# Patient Record
Sex: FEMALE | Race: WHITE | NOT HISPANIC OR LATINO | Employment: FULL TIME | URBAN - NONMETROPOLITAN AREA
[De-identification: names, ages, dates, MRNs, and addresses within clinical notes are randomized per-mention and may not be internally consistent; named-entity substitution may affect disease eponyms.]

---

## 2019-09-03 ENCOUNTER — OFFICE VISIT (OUTPATIENT)
Dept: FAMILY MEDICINE CLINIC | Facility: CLINIC | Age: 34
End: 2019-09-03

## 2019-09-03 VITALS
WEIGHT: 143 LBS | SYSTOLIC BLOOD PRESSURE: 112 MMHG | OXYGEN SATURATION: 100 % | DIASTOLIC BLOOD PRESSURE: 81 MMHG | HEART RATE: 85 BPM | HEIGHT: 64 IN | TEMPERATURE: 98 F | BODY MASS INDEX: 24.41 KG/M2

## 2019-09-03 DIAGNOSIS — E28.2 POLYCYSTIC OVARY SYNDROME: Primary | ICD-10-CM

## 2019-09-03 DIAGNOSIS — N92.0 MENORRHAGIA WITH REGULAR CYCLE: ICD-10-CM

## 2019-09-03 DIAGNOSIS — R53.83 FATIGUE, UNSPECIFIED TYPE: ICD-10-CM

## 2019-09-03 DIAGNOSIS — N92.6 IRREGULAR MENSES: ICD-10-CM

## 2019-09-03 DIAGNOSIS — E55.9 VITAMIN D DEFICIENCY: ICD-10-CM

## 2019-09-03 DIAGNOSIS — E78.5 DYSLIPIDEMIA: ICD-10-CM

## 2019-09-03 DIAGNOSIS — E07.9 THYROID DISORDER: ICD-10-CM

## 2019-09-03 DIAGNOSIS — D50.8 OTHER IRON DEFICIENCY ANEMIA: ICD-10-CM

## 2019-09-03 DIAGNOSIS — L68.0 HIRSUTISM: ICD-10-CM

## 2019-09-03 LAB
ALBUMIN SERPL-MCNC: 4.4 G/DL (ref 3.5–4.8)
ALBUMIN/GLOB SERPL: 1.3 G/DL (ref 1–1.7)
ALP SERPL-CCNC: 52 U/L (ref 32–91)
ALT SERPL W P-5'-P-CCNC: 15 U/L (ref 14–54)
ANION GAP SERPL CALCULATED.3IONS-SCNC: 14.2 MMOL/L (ref 5–15)
ARTICHOKE IGE QN: 146 MG/DL (ref 0–100)
AST SERPL-CCNC: 19 U/L (ref 15–41)
BILIRUB SERPL-MCNC: 0.9 MG/DL (ref 0.3–1.2)
BUN BLD-MCNC: 11 MG/DL (ref 8–20)
BUN/CREAT SERPL: 18.3 (ref 5.4–26.2)
CALCIUM SPEC-SCNC: 9.5 MG/DL (ref 8.9–10.3)
CHLORIDE SERPL-SCNC: 102 MMOL/L (ref 101–111)
CHOLEST SERPL-MCNC: 222 MG/DL
CO2 SERPL-SCNC: 25 MMOL/L (ref 22–32)
CREAT BLD-MCNC: 0.6 MG/DL (ref 0.4–1)
DEPRECATED RDW RBC AUTO: 39.4 FL (ref 37–54)
ERYTHROCYTE [DISTWIDTH] IN BLOOD BY AUTOMATED COUNT: 14.6 % (ref 12.3–15.4)
FERRITIN SERPL-MCNC: 4 NG/ML (ref 11–307)
GFR SERPL CREATININE-BSD FRML MDRD: 114 ML/MIN/1.73
GLOBULIN UR ELPH-MCNC: 3.4 GM/DL (ref 2.5–3.8)
GLUCOSE BLD-MCNC: 83 MG/DL (ref 65–99)
HCT VFR BLD AUTO: 40.4 % (ref 34–46.6)
HDLC SERPL QL: 3.36
HDLC SERPL-MCNC: 66 MG/DL
HGB BLD-MCNC: 13.6 G/DL (ref 12–15.9)
IRON 24H UR-MRATE: 34 MCG/DL (ref 28–170)
IRON SATN MFR SERPL: 8 % (ref 15–50)
LDLC/HDLC SERPL: 2.18 {RATIO}
MCH RBC QN AUTO: 26.1 PG (ref 26.6–33)
MCHC RBC AUTO-ENTMCNC: 33.6 G/DL (ref 31.5–35.7)
MCV RBC AUTO: 77.6 FL (ref 79–97)
PLATELET # BLD AUTO: 230 10*3/MM3 (ref 140–450)
PMV BLD AUTO: 9.7 FL (ref 6–12)
POTASSIUM BLD-SCNC: 4.2 MMOL/L (ref 3.6–5.1)
PROT SERPL-MCNC: 7.8 G/DL (ref 6.1–7.9)
RBC # BLD AUTO: 5.21 10*6/MM3 (ref 3.77–5.28)
SODIUM BLD-SCNC: 137 MMOL/L (ref 136–144)
T4 FREE SERPL-MCNC: 0.99 NG/DL (ref 0.58–1.64)
TIBC SERPL-MCNC: 412 MCG/DL (ref 228–428)
TRANSFERRIN SERPL-MCNC: 294 MG/DL (ref 200–360)
TRIGL SERPL-MCNC: 60 MG/DL
TSH SERPL DL<=0.05 MIU/L-ACNC: 0.43 UIU/ML (ref 0.34–5.6)
VIT B12 BLD-MCNC: 260 PG/ML (ref 180–914)
VLDLC SERPL-MCNC: 12 MG/DL
WBC NRBC COR # BLD: 6.3 10*3/MM3 (ref 3.4–10.8)

## 2019-09-03 PROCEDURE — 82306 VITAMIN D 25 HYDROXY: CPT | Performed by: FAMILY MEDICINE

## 2019-09-03 PROCEDURE — 84466 ASSAY OF TRANSFERRIN: CPT | Performed by: FAMILY MEDICINE

## 2019-09-03 PROCEDURE — 82607 VITAMIN B-12: CPT | Performed by: FAMILY MEDICINE

## 2019-09-03 PROCEDURE — 84439 ASSAY OF FREE THYROXINE: CPT | Performed by: FAMILY MEDICINE

## 2019-09-03 PROCEDURE — 80053 COMPREHEN METABOLIC PANEL: CPT | Performed by: FAMILY MEDICINE

## 2019-09-03 PROCEDURE — 85027 COMPLETE CBC AUTOMATED: CPT | Performed by: FAMILY MEDICINE

## 2019-09-03 PROCEDURE — 84443 ASSAY THYROID STIM HORMONE: CPT | Performed by: FAMILY MEDICINE

## 2019-09-03 PROCEDURE — 83540 ASSAY OF IRON: CPT | Performed by: FAMILY MEDICINE

## 2019-09-03 PROCEDURE — 99203 OFFICE O/P NEW LOW 30 MIN: CPT | Performed by: FAMILY MEDICINE

## 2019-09-03 PROCEDURE — 80061 LIPID PANEL: CPT | Performed by: FAMILY MEDICINE

## 2019-09-03 PROCEDURE — 82728 ASSAY OF FERRITIN: CPT | Performed by: FAMILY MEDICINE

## 2019-09-03 RX ORDER — MULTIPLE VITAMINS W/ MINERALS TAB 9MG-400MCG
1 TAB ORAL DAILY
COMMUNITY

## 2019-09-05 LAB — 25(OH)D3 SERPL-MCNC: 32.4 NG/ML (ref 30–100)

## 2019-09-17 ENCOUNTER — OFFICE VISIT (OUTPATIENT)
Dept: FAMILY MEDICINE CLINIC | Facility: CLINIC | Age: 34
End: 2019-09-17

## 2019-09-17 VITALS
SYSTOLIC BLOOD PRESSURE: 104 MMHG | BODY MASS INDEX: 23.63 KG/M2 | OXYGEN SATURATION: 100 % | HEIGHT: 65 IN | WEIGHT: 141.8 LBS | HEART RATE: 76 BPM | TEMPERATURE: 97.9 F | DIASTOLIC BLOOD PRESSURE: 69 MMHG

## 2019-09-17 DIAGNOSIS — E78.5 DYSLIPIDEMIA: Primary | ICD-10-CM

## 2019-09-17 DIAGNOSIS — L68.0 HIRSUTISM: ICD-10-CM

## 2019-09-17 DIAGNOSIS — N92.0 MENORRHAGIA WITH REGULAR CYCLE: ICD-10-CM

## 2019-09-17 DIAGNOSIS — E28.2 POLYCYSTIC OVARY SYNDROME: ICD-10-CM

## 2019-09-17 LAB
FSH SERPL-ACNC: 5.47 MIU/ML
LH SERPL-ACNC: 29.85 MIU/ML
PROLACTIN SERPL-MCNC: 5.82 NG/ML (ref 3.3–27)

## 2019-09-17 PROCEDURE — 84146 ASSAY OF PROLACTIN: CPT | Performed by: FAMILY MEDICINE

## 2019-09-17 PROCEDURE — 83002 ASSAY OF GONADOTROPIN (LH): CPT | Performed by: FAMILY MEDICINE

## 2019-09-17 PROCEDURE — 99213 OFFICE O/P EST LOW 20 MIN: CPT | Performed by: FAMILY MEDICINE

## 2019-09-17 PROCEDURE — 84402 ASSAY OF FREE TESTOSTERONE: CPT | Performed by: FAMILY MEDICINE

## 2019-09-17 PROCEDURE — 83001 ASSAY OF GONADOTROPIN (FSH): CPT | Performed by: FAMILY MEDICINE

## 2019-09-17 RX ORDER — INFLUENZA VIRUS VACCINE 15; 15; 15; 15 UG/.5ML; UG/.5ML; UG/.5ML; UG/.5ML
SUSPENSION INTRAMUSCULAR
Refills: 0 | COMMUNITY
Start: 2019-09-13 | End: 2020-04-21

## 2019-09-20 RX ORDER — SPIRONOLACTONE 25 MG/1
25 TABLET ORAL DAILY
Qty: 30 TABLET | Refills: 3 | Status: SHIPPED | OUTPATIENT
Start: 2019-09-20 | End: 2020-04-21 | Stop reason: SINTOL

## 2019-09-20 RX ORDER — METFORMIN HYDROCHLORIDE 500 MG/1
500 TABLET, FILM COATED, EXTENDED RELEASE ORAL
Qty: 30 TABLET | Refills: 3 | Status: SHIPPED | OUTPATIENT
Start: 2019-09-20 | End: 2020-04-09 | Stop reason: SDUPTHER

## 2019-09-21 LAB — TESTOST FREE SERPL-MCNC: 1.1 PG/ML (ref 0–4.2)

## 2019-09-27 ENCOUNTER — DOCUMENTATION (OUTPATIENT)
Dept: FAMILY MEDICINE CLINIC | Facility: CLINIC | Age: 34
End: 2019-09-27

## 2019-10-21 NOTE — PROGRESS NOTES
Ismael Mackey is a 34 y.o. female.   Pt here for lab fu  Hx  Polycystic Ovary  Syndrome with hirsutism  Labs reviewed  Elevated total cholesterol and LDL, healthy heart diet recommended  Hormone levels need to be checked due  To patient concern about menorrhagia, PCOS, hirsutism      Chief Complaint   Patient presents with   • Follow-up     1 week follow up         The following portions of the patient's history were reviewed and updated as appropriate: allergies, current medications, past family history, past medical history, past social history, past surgical history and problem list.    Past Medical History:   Diagnosis Date   • Hirsutism    • Polycystic ovary disease        Past Surgical History:   Procedure Laterality Date   •  SECTION     • PELVIC LAPAROSCOPY      ovarian cyst       Family History   Problem Relation Age of Onset   • Hypertension Mother    • Thyroid disease Mother    • Hyperlipidemia Mother    • Heart disease Father        Review of Systems   Genitourinary: Positive for menstrual problem (heavy periods).   Skin:        hirsutism       Objective   Physical Exam   Constitutional: She is oriented to person, place, and time. She appears well-developed and well-nourished. No distress.   HENT:   Head: Normocephalic and atraumatic.   Right Ear: External ear normal.   Left Ear: External ear normal.   Nose: Nose normal.   Mouth/Throat: Oropharynx is clear and moist.   Eyes: EOM are normal. Pupils are equal, round, and reactive to light.   Neck: Normal range of motion. Neck supple. No thyromegaly present.   Cardiovascular: Normal rate, regular rhythm and normal heart sounds. Exam reveals no gallop and no friction rub.   No murmur heard.  Pulmonary/Chest: Effort normal. She has no wheezes.   Abdominal: Soft. There is no tenderness.   Musculoskeletal: Normal range of motion. She exhibits no edema, tenderness or deformity.   Lymphadenopathy:     She has no cervical adenopathy.    Neurological: She is alert and oriented to person, place, and time. No cranial nerve deficit.   Skin: Skin is warm and dry. No rash noted. She is not diaphoretic.   Hirsute female   Psychiatric: She has a normal mood and affect. Her behavior is normal. Judgment and thought content normal.   Nursing note and vitals reviewed.        Assessment/Plan   Tez was seen today for follow-up.    Diagnoses and all orders for this visit:    Dyslipidemia    Polycystic ovary syndrome  -     Follicle stimulating hormone  -     Prolactin  -     Luteinizing hormone  -     Testosterone Free Direct    Hirsutism  -     Follicle stimulating hormone  -     Prolactin  -     Luteinizing hormone  -     Testosterone Free Direct    Menorrhagia with regular cycle      Fu labs  Low cholesteterol diet recommended           Vitals:    09/17/19 0932   BP: 104/69   Pulse: 76   Temp: 97.9 °F (36.6 °C)   SpO2: 100%       LDL Cholesterol    Date Value Ref Range Status   09/03/2019 146 (H) 0 - 100 mg/dL Final

## 2020-04-09 RX ORDER — METFORMIN HYDROCHLORIDE 500 MG/1
500 TABLET, FILM COATED, EXTENDED RELEASE ORAL
Qty: 30 TABLET | Refills: 0 | Status: SHIPPED | OUTPATIENT
Start: 2020-04-09 | End: 2020-04-14 | Stop reason: SDUPTHER

## 2020-04-14 RX ORDER — METFORMIN HYDROCHLORIDE 500 MG/1
500 TABLET, FILM COATED, EXTENDED RELEASE ORAL
Qty: 30 TABLET | Refills: 0 | Status: SHIPPED | OUTPATIENT
Start: 2020-04-14 | End: 2020-07-08

## 2020-04-21 ENCOUNTER — OFFICE VISIT (OUTPATIENT)
Dept: FAMILY MEDICINE CLINIC | Facility: CLINIC | Age: 35
End: 2020-04-21

## 2020-04-21 DIAGNOSIS — E28.2 PCOS (POLYCYSTIC OVARIAN SYNDROME): Primary | ICD-10-CM

## 2020-04-21 PROCEDURE — 99212 OFFICE O/P EST SF 10 MIN: CPT | Performed by: FAMILY MEDICINE

## 2020-04-21 RX ORDER — METFORMIN HYDROCHLORIDE 500 MG/1
500 TABLET, EXTENDED RELEASE ORAL
Qty: 90 TABLET | Refills: 1 | Status: SHIPPED | OUTPATIENT
Start: 2020-04-21

## 2020-04-21 NOTE — PROGRESS NOTES
Ismael Mackey is a 34 y.o. female.     Chief Complaint   Patient presents with   • Polycystic Ovary Syndrome     med refill     You have chosen to receive care through a telephone visit. Do you consent to use a telephone visit for your medical care today? Yes    History of Present Illness Pt doing well on metformin for PCOS, no need to increase dose  spirinoloactose had side effects and she discontinued this  She continues to work at the pharmacy.  Has had no known sick exposures, no cough, no fever  Her  is now here from [place] and they are all doing well    The following portions of the patient's history were reviewed and updated as appropriate: allergies, current medications, past family history, past medical history, past social history, past surgical history and problem list.    Past Medical History:   Diagnosis Date   • Hirsutism    • Polycystic ovary disease        Past Surgical History:   Procedure Laterality Date   •  SECTION     • PELVIC LAPAROSCOPY      ovarian cyst       Family History   Problem Relation Age of Onset   • Hypertension Mother    • Thyroid disease Mother    • Hyperlipidemia Mother    • Heart disease Father        Review of Systems   Constitutional: Negative for fever.   HENT: Negative for congestion and sore throat.    Respiratory: Negative for cough and shortness of breath.        Objective   Physical Exam  There were no vitals filed for this visit.  There is no height or weight on file to calculate BMI.    LDL Cholesterol    Date Value Ref Range Status   2019 146 (H) 0 - 100 mg/dL Final     TSH   Date Value Ref Range Status   2019 0.430 0.340 - 5.600 uIU/mL Final     Comment:     Results may be falsely decreased if patient taking Biotin.     Results for orders placed or performed in visit on 19   Testosterone Free Direct   Result Value Ref Range    Testosterone, Free 1.1 0.0 - 4.2 pg/mL   Prolactin   Result Value Ref Range    Prolactin  5.82 3.30 - 27.00 ng/mL   Luteinizing hormone   Result Value Ref Range    LH 29.85 mIU/mL   Follicle stimulating hormone   Result Value Ref Range    FSH 5.47 mIU/mL   Results for orders placed or performed in visit on 09/03/19   Iron Profile   Result Value Ref Range    Iron 34 28 - 170 mcg/dL    Iron Saturation 8 (L) 15 - 50 %    Transferrin 294 200 - 360 mg/dL    TIBC 412 228 - 428 mcg/dL   Vitamin D 25 Hydroxy   Result Value Ref Range    25 Hydroxy, Vitamin D 32.4 30.0 - 100.0 ng/ml   CBC (No Diff)   Result Value Ref Range    WBC 6.30 3.40 - 10.80 10*3/mm3    RBC 5.21 3.77 - 5.28 10*6/mm3    Hemoglobin 13.6 12.0 - 15.9 g/dL    Hematocrit 40.4 34.0 - 46.6 %    MCV 77.6 (L) 79.0 - 97.0 fL    MCH 26.1 (L) 26.6 - 33.0 pg    MCHC 33.6 31.5 - 35.7 g/dL    RDW 14.6 12.3 - 15.4 %    RDW-SD 39.4 37.0 - 54.0 fl    MPV 9.7 6.0 - 12.0 fL    Platelets 230 140 - 450 10*3/mm3   TSH   Result Value Ref Range    TSH 0.430 0.340 - 5.600 uIU/mL   T4, free   Result Value Ref Range    Free T4 0.99 0.58 - 1.64 ng/dL   Ferritin   Result Value Ref Range    Ferritin 4.00 (L) 11.00 - 307.00 ng/mL   Vitamin B12   Result Value Ref Range    Vitamin B-12 260 180 - 914 pg/mL   Lipid Panel   Result Value Ref Range    Total Cholesterol 222 (H) <=200 mg/dL    Triglycerides 60 <=150 mg/dL    HDL Cholesterol 66 >=39 mg/dL    LDL Cholesterol  146 (H) 0 - 100 mg/dL    VLDL Cholesterol 12 mg/dL    LDL/HDL Ratio 2.18     Chol/HDL Ratio 3.36    Comprehensive Metabolic Panel   Result Value Ref Range    Glucose 83 65 - 99 mg/dL    BUN 11 8 - 20 mg/dL    Creatinine 0.60 0.40 - 1.00 mg/dL    Sodium 137 136 - 144 mmol/L    Potassium 4.2 3.6 - 5.1 mmol/L    Chloride 102 101 - 111 mmol/L    CO2 25.0 22.0 - 32.0 mmol/L    Calcium 9.5 8.9 - 10.3 mg/dL    Total Protein 7.8 6.1 - 7.9 g/dL    Albumin 4.40 3.50 - 4.80 g/dL    ALT (SGPT) 15 14 - 54 U/L    AST (SGOT) 19 15 - 41 U/L    Alkaline Phosphatase 52 32 - 91 U/L    Total Bilirubin 0.9 0.3 - 1.2 mg/dL    eGFR Non   Amer 114 >60 mL/min/1.73    Globulin 3.4 2.5 - 3.8 gm/dL    A/G Ratio 1.3 1.0 - 1.7 g/dL    BUN/Creatinine Ratio 18.3 5.4 - 26.2    Anion Gap 14.2 5.0 - 15.0 mmol/L       Assessment/Plan   Tez was seen today for polycystic ovary syndrome.    Diagnoses and all orders for this visit:    PCOS (polycystic ovarian syndrome)    Other orders  -     metFORMIN ER (GLUCOPHAGE-XR) 500 MG 24 hr tablet; Take 1 tablet by mouth Daily With Breakfast.    continue metformin  Fu 4 months  Continue [facility] protocal    10 min spent discussing dignoses, treatment

## 2020-07-08 ENCOUNTER — TELEPHONE (OUTPATIENT)
Dept: FAMILY MEDICINE CLINIC | Facility: CLINIC | Age: 35
End: 2020-07-08

## 2020-07-08 NOTE — TELEPHONE ENCOUNTER
DINA CALLED WANTING A REFERRAL SENT TO [facility] IN [place] SHE IS HAVING TROUBLE HEARING IN ONE EAR AND THEY TOLD HER THAT HER PCP NEEDS TO SEND IN A REFERRAL

## 2020-07-09 NOTE — TELEPHONE ENCOUNTER
Called patient to see which ear she has complaints of. No answer. Per HIPAA, may leave detailed VM. VM left provider is inquiring which ear she is wanting to be evaluated. Advised her to contact office to advise us so referral can be completed.

## 2020-07-16 NOTE — TELEPHONE ENCOUNTER
Called pt again. No answer. LVM for pt to contact office to advise us of which ear so referral could be completed for her.